# Patient Record
Sex: MALE | Race: WHITE | Employment: OTHER | URBAN - METROPOLITAN AREA
[De-identification: names, ages, dates, MRNs, and addresses within clinical notes are randomized per-mention and may not be internally consistent; named-entity substitution may affect disease eponyms.]

---

## 2017-08-23 ENCOUNTER — ANESTHESIA (OUTPATIENT)
Dept: ENDOSCOPY | Age: 67
End: 2017-08-23
Payer: MEDICARE

## 2017-08-23 ENCOUNTER — HOSPITAL ENCOUNTER (OUTPATIENT)
Age: 67
Setting detail: OUTPATIENT SURGERY
Discharge: HOME | End: 2017-08-23
Attending: INTERNAL MEDICINE | Admitting: INTERNAL MEDICINE
Payer: MEDICARE

## 2017-08-23 ENCOUNTER — ANESTHESIA EVENT (OUTPATIENT)
Dept: ENDOSCOPY | Age: 67
End: 2017-08-23
Payer: MEDICARE

## 2017-08-23 VITALS
WEIGHT: 211 LBS | DIASTOLIC BLOOD PRESSURE: 73 MMHG | HEIGHT: 70 IN | TEMPERATURE: 98.6 F | BODY MASS INDEX: 30.21 KG/M2 | SYSTOLIC BLOOD PRESSURE: 141 MMHG | HEART RATE: 69 BPM | OXYGEN SATURATION: 100 % | RESPIRATION RATE: 18 BRPM

## 2017-08-23 PROCEDURE — 74011000250 HC RX REV CODE- 250: Performed by: ANESTHESIOLOGY

## 2017-08-23 PROCEDURE — 77030008969: Performed by: INTERNAL MEDICINE

## 2017-08-23 PROCEDURE — 76060000031 HC ANESTHESIA FIRST 0.5 HR: Performed by: INTERNAL MEDICINE

## 2017-08-23 PROCEDURE — 74011250636 HC RX REV CODE- 250/636: Performed by: ANESTHESIOLOGY

## 2017-08-23 PROCEDURE — 76040000025: Performed by: INTERNAL MEDICINE

## 2017-08-23 PROCEDURE — 74011000250 HC RX REV CODE- 250

## 2017-08-23 PROCEDURE — 74011250636 HC RX REV CODE- 250/636

## 2017-08-23 RX ORDER — PROPOFOL 10 MG/ML
INJECTION, EMULSION INTRAVENOUS AS NEEDED
Status: DISCONTINUED | OUTPATIENT
Start: 2017-08-23 | End: 2017-08-23 | Stop reason: HOSPADM

## 2017-08-23 RX ORDER — PROPOFOL 10 MG/ML
INJECTION, EMULSION INTRAVENOUS
Status: DISCONTINUED | OUTPATIENT
Start: 2017-08-23 | End: 2017-08-23 | Stop reason: HOSPADM

## 2017-08-23 RX ORDER — LIDOCAINE HYDROCHLORIDE 20 MG/ML
INJECTION, SOLUTION EPIDURAL; INFILTRATION; INTRACAUDAL; PERINEURAL AS NEEDED
Status: DISCONTINUED | OUTPATIENT
Start: 2017-08-23 | End: 2017-08-23 | Stop reason: HOSPADM

## 2017-08-23 RX ORDER — SODIUM CHLORIDE 0.9 % (FLUSH) 0.9 %
5-10 SYRINGE (ML) INJECTION AS NEEDED
Status: CANCELLED | OUTPATIENT
Start: 2017-08-23

## 2017-08-23 RX ORDER — SODIUM CHLORIDE, SODIUM LACTATE, POTASSIUM CHLORIDE, CALCIUM CHLORIDE 600; 310; 30; 20 MG/100ML; MG/100ML; MG/100ML; MG/100ML
100 INJECTION, SOLUTION INTRAVENOUS CONTINUOUS
Status: CANCELLED | OUTPATIENT
Start: 2017-08-23

## 2017-08-23 RX ORDER — SODIUM CHLORIDE, SODIUM LACTATE, POTASSIUM CHLORIDE, CALCIUM CHLORIDE 600; 310; 30; 20 MG/100ML; MG/100ML; MG/100ML; MG/100ML
100 INJECTION, SOLUTION INTRAVENOUS CONTINUOUS
Status: DISCONTINUED | OUTPATIENT
Start: 2017-08-23 | End: 2017-08-23 | Stop reason: HOSPADM

## 2017-08-23 RX ADMIN — LIDOCAINE HYDROCHLORIDE 100 MG: 20 INJECTION, SOLUTION EPIDURAL; INFILTRATION; INTRACAUDAL; PERINEURAL at 15:21

## 2017-08-23 RX ADMIN — FAMOTIDINE 20 MG: 10 INJECTION, SOLUTION INTRAVENOUS at 14:29

## 2017-08-23 RX ADMIN — SODIUM CHLORIDE, SODIUM LACTATE, POTASSIUM CHLORIDE, AND CALCIUM CHLORIDE 100 ML/HR: 600; 310; 30; 20 INJECTION, SOLUTION INTRAVENOUS at 14:25

## 2017-08-23 RX ADMIN — PROPOFOL 100 MG: 10 INJECTION, EMULSION INTRAVENOUS at 15:21

## 2017-08-23 RX ADMIN — PROPOFOL 200 MCG/KG/MIN: 10 INJECTION, EMULSION INTRAVENOUS at 15:21

## 2017-08-23 NOTE — PROGRESS NOTES
Patient passing flatus, tolerating po fluids well. Patient escorted to car via wheelchair  by Chelsy  for discharge home with son. Discharge instructions reviewed. Dr. Juancho Thompson spoke with pt and family.

## 2017-08-23 NOTE — H&P
History and Physical for Endoscopic Ultrasound             Date: 8/23/2017     History of Present Illness:  Patient presents to undergo endoscopic ultrasound for evaluation of a suspected subepithelial lesion in the stomach. Patient denies any diarrhea, constipation, tenesmus, GI bleeding, or weight loss. Past Medical History:   Diagnosis Date    GERD (gastroesophageal reflux disease)     Gout     Hypertension      Past Surgical History:   Procedure Laterality Date    HX CATARACT REMOVAL      HX COLONOSCOPY        Family History   Problem Relation Age of Onset    No Known Problems Mother     No Known Problems Father      Social History   Substance Use Topics    Smoking status: Former Smoker     Quit date: 8/22/1978    Smokeless tobacco: Never Used    Alcohol use No        No Known Allergies  No current facility-administered medications for this encounter. Review of Systems:  A detailed 10 organ review of systems is obtained with pertinent positives as listed in the History of Present Illness. All others are negative. Objective:     Physical Exam:  There were no vitals taken for this visit. General:  Alert and oriented. Heart: Regular rate and rhythm  Lungs:  Clear to auscultation bilaterally  Abdomen: Soft, nontender, nondistended    Impression/Plan:     Proceed with EUS as planned. I have discussed with the patient the technique, benefits, alternatives, and risks of the procedure, including medication reaction, immediate or delayed bleeding, or perforation of the gastrointestinal tract.     Signed By: Saul Sterling MD     August 23, 2017

## 2017-08-23 NOTE — DISCHARGE INSTRUCTIONS
Gastrointestinal Esophagogastroduodenoscopy (EGD) - Upper Exam Discharge Instructions    1. Call Dr. Daniele Cuellar at [phone] for any problems or questions. 2. Contact the doctor's office for follow up appointment as directed. 3. Medication may cause drowsiness for several hours, therefore, do not drive or                  operate machinery for remainder of the day. 4. No alcohol today. 5. Ordinarily, you may resume regular diet and activity after exam unless otherwise              specified by your physician. 6. For mild soreness in your throat you may use Cepacol throat lozenges or warm               salt-water gargles as needed. Any additional instructions:  Plan to repeat EUS in one year. Office will arrange follow up. Instructions given to Laureano Tavera and son.   Instructions given by:  Arben Patel RN

## 2017-08-23 NOTE — IP AVS SNAPSHOT
[facility] 
 
 
 [address] [facility] [[email or web address]] [address] [facility] 
[phone] Patient: Dk Cohen MRN: [id number] ANASTASIA:69/72/2137 You are allergic to the following No active allergies Recent Documentation Height Weight BMI Smoking Status 1.778 m 95.7 kg 30.28 kg/m2 Former Smoker Emergency Contacts Name Discharge Info Relation Home Work Mobile Sami Ambriz  Son [22] [phone] About your hospitalization You were admitted on:  August 23, 2017 You last received care in the:  [facility] You were discharged on:  August 23, 2017 Unit phone number:  [phone] Why you were hospitalized Your primary diagnosis was:  Not on File Providers Seen During Your Hospitalizations Provider Role Specialty Primary office phone Yolanda Munguia MD Attending Provider Gastroenterology [phone] Your Primary Care Physician (PCP) Primary Care Physician Office Phone Office Fax UNKNOWN, PROVIDER ** None ** ** None ** Follow-up Information None Current Discharge Medication List  
  
ASK your doctor about these medications Dose & Instructions Dispensing Information Comments Morning Noon Evening Bedtime  
 allopurinol 100 mg tablet Commonly known as:  Fatou Salts Your last dose was: Your next dose is:    
   
   
 Dose:  100 mg Take 100 mg by mouth daily. Refills:  0  
     
   
   
   
  
 amLODIPine 10 mg tablet Commonly known as:  Remonia Grates Your last dose was: Your next dose is:    
   
   
 Dose:  10 mg Take 10 mg by mouth daily. Refills:  0  
     
   
   
   
  
 BENADRYL 25 mg capsule Generic drug:  diphenhydrAMINE Your last dose was: Your next dose is:    
   
   
 Dose:  50 mg Take 50 mg by mouth nightly as needed. Refills:  0  
     
   
   
   
  
 FLOMAX 0.4 mg capsule Generic drug:  tamsulosin Your last dose was: Your next dose is:    
   
   
 Dose:  0.4 mg Take 0.4 mg by mouth daily. Refills:  0  
     
   
   
   
  
 melatonin 3 mg tablet Your last dose was: Your next dose is:    
   
   
 Dose:  3 mg Take 3 mg by mouth nightly. Refills:  0 PROTONIX 20 mg tablet Generic drug:  pantoprazole Your last dose was: Your next dose is:    
   
   
 Dose:  20 mg Take 20 mg by mouth two (2) times a day. Refills:  0 Discharge Instructions Gastrointestinal Esophagogastroduodenoscopy (EGD) - Upper Exam Discharge Instructions 1. Call Dr. Gallito Morris at [phone] for any problems or questions. 2. Contact the doctor's office for follow up appointment as directed. 3. Medication may cause drowsiness for several hours, therefore, do not drive or                  operate machinery for remainder of the day. 4. No alcohol today. 5. Ordinarily, you may resume regular diet and activity after exam unless otherwise              specified by your physician. 6. For mild soreness in your throat you may use Cepacol throat lozenges or warm               salt-water gargles as needed. Any additional instructions:  Plan to repeat EUS in one year. Office will arrange follow up. Instructions given to Matilda Najjar and son. Instructions given by:  Rip Lundborg, RN Discharge Orders None Introducing [facility]! [address] [facility] introduces [employer] patient portal. Now you can access parts of your medical record, email your doctor's office, and request medication refills online. 1. In your internet browser, go to [email or web address] 2. Click on the First Time User? Click Here link in the Sign In box. You will see the New Member Sign Up page. 3. Enter your [employer] Access Code exactly as it appears below.  You will not need to use this code after youve completed the sign-up process. If you do not sign up before the expiration date, you must request a new code. · [facility] Access Code: [email or web address] Expires: 11/13/2017  3:28 PM 
 
4. Enter the last four digits of your Social Security Number (xxxx) and Date of Birth (mm/dd/yyyy) as indicated and click Submit. You will be taken to the next sign-up page. 5. Create a [facility] ID. This will be your [facility] [employer] login ID and cannot be changed, so think of one that is secure and easy to remember. 6. Create a [employer] password. You can change your password at any time. 7. Enter your Password Reset Question and Answer. This can be used at a later time if you forget your password. 8. Enter your e-mail address. You will receive e-mail notification when new information is available in [address] 9. Click Sign Up. You can now view and download portions of your medical record. 10. Click the Download Summary menu link to download a portable copy of your medical information. If you have questions, please visit the Frequently Asked Questions section of the [facility] [employer] website. Remember, [facility] is NOT to be used for urgent needs. For medical emergencies, dial 911. Now available from your iPhone and Android! General Information Please provide this summary of care documentation to your next provider. Patient Signature:  ____________________________________________________________ Date:  ____________________________________________________________  
  
Claudene Born Provider Signature:  ____________________________________________________________ Date:  ____________________________________________________________

## 2017-08-23 NOTE — OP NOTES
Gastroenterology Procedure Note              Procedure:  Endoscopic ultrasound with Doppler     Date of Procedure:  8/23/2017    Patient:  Matilda Najjar     1950    Indication:  Gastric mass     Sedation:  MAC    Pre-Procedure Physical Exam:    Mental status:  alert and oriented  Airway:  normal oropharyngeal airway and neck mobility  CV:  regular rate and rhythm  Respiratory:  clear to auscultation    Procedure:  A History and Physical has been performed, and patient medication allergies have been  reviewed. Risks of perforation, hemorrhage, adverse drug reaction, and aspiration were discussed. Informed consent was obtained for the procedure, including sedation. The patient was placed in the left lateral decubitus position. The heart rate, oxygen saturations, blood pressure, and response to care were monitored throughout the procedure. The radial echoendoscope was passed through the mouth and advanced under direct vision to the distal duodenum. As the scope was slowly withdrawn, detailed endoscopic images were obtained from the surrounding organs. The patient tolerated the procedure well. Findings:     ENDOSCOPIC FINDINGS:  Limited views of the mucosa with the oblique-viewing echoendoscope reveals a 1 cm subepithelial lesion in the proximal stomach. There are no other gross abnormalities of the stomach or proximal duodenum. STOMACH:  The balloon was insufflated to improve acoustic coupling. Multiple sweeps were made throughout the stomach visualizing the entire wall from the pylorus the gastroesophageal junction. At a distance 44 cm from the incisors, a 7 x 6 mm hypoechoic lesion is seen arising from the submucosal layer of the gastric wall. This is too small for FNA. The remainder of the gastric wall is of normal thickness and normal wall architecture. OTHER ORGANS:  Views of the left lobe of the liver demonstrate diffuse heterogeneity without nodular contour.  The left adrenal gland appears normal. Due to the focused nature of the examination, detailed images of the pancreas and biliary tree were nor obtained. There are no pathologically enlarged upper abdominal lymph nodes. Specimen:  No    Estimated Blood Loss:  None    Implant:  None           Impression:    1. Gastric submucosal lesion. 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 No vascular flow to suggest varix. The differential diagnosis includes gastrointestinal stromal tumor, carcinoid tumor, granular cell tumor tumor. 2. Heterogeneity of the liver. There may represent early or micronodular cirrhosis. Plan:  1. Resume diet and current medications. 2. Would repeat EUS in 1 year to ensure no interval enlargement. 3. Return to office in 1-2 months for ongoing care.     Signed:  Mi Hanna MD  8/23/2017  3:36 PM

## 2017-08-23 NOTE — ANESTHESIA PREPROCEDURE EVALUATION
Anesthetic History   No history of anesthetic complications            Review of Systems / Medical History  Patient summary reviewed and pertinent labs reviewed    Pulmonary  Within defined limits                 Neuro/Psych   Within defined limits           Cardiovascular    Hypertension: well controlled          Hyperlipidemia    Exercise tolerance: >4 METS     GI/Hepatic/Renal     GERD: well controlled           Endo/Other  Within defined limits           Other Findings              Physical Exam    Airway  Mallampati: II  TM Distance: > 6 cm  Neck ROM: normal range of motion   Mouth opening: Normal     Cardiovascular    Rhythm: regular  Rate: normal         Dental    Dentition: Full upper dentures and Lower partial plate     Pulmonary  Breath sounds clear to auscultation               Abdominal         Other Findings            Anesthetic Plan    ASA: 2  Anesthesia type: total IV anesthesia            Anesthetic plan and risks discussed with: Patient

## 2017-08-23 NOTE — IP AVS SNAPSHOT
Summary of Care Report The Summary of Care report has been created to help improve care coordination. Users with access to [email or web address] or [address] (Web-based application) may access additional patient information including the Discharge Summary. If you are not currently a [address] user and need more information, please call the number listed below in the [place] [address] section and ask to be connected with Medical Records. Facility Information Name Address Phone [address] [facility] [address] [phone] Patient Information Patient Name Sex STANFORD Azevedo ([id number]) Male 1950 Discharge Information Admitting Provider Service Area Unit Annemarie Merino MD / [facility] / [phone] Discharge Provider Discharge Date/Time Discharge Disposition Destination (none) (none) (none) (none) Patient Language Language ENGLISH [13] You are allergic to the following No active allergies Current Discharge Medication List  
  
ASK your doctor about these medications Dose & Instructions Dispensing Information Comments  
 allopurinol 100 mg tablet Commonly known as:  Raffaele Jerome Dose:  100 mg Take 100 mg by mouth daily. Refills:  0  
   
 amLODIPine 10 mg tablet Commonly known as:  Valentine Ruiz Dose:  10 mg Take 10 mg by mouth daily. Refills:  0  
   
 BENADRYL 25 mg capsule Generic drug:  diphenhydrAMINE Dose:  50 mg Take 50 mg by mouth nightly as needed. Refills:  0  
   
 FLOMAX 0.4 mg capsule Generic drug:  tamsulosin Dose:  0.4 mg Take 0.4 mg by mouth daily. Refills:  0  
   
 melatonin 3 mg tablet Dose:  3 mg Take 3 mg by mouth nightly. Refills:  0 PROTONIX 20 mg tablet Generic drug:  pantoprazole  Dose:  20 mg  
 Take 20 mg by mouth two (2) times a day. Refills:  0 Surgery Information ID Date/Time Status Primary Surgeon All Procedures Location [id number] 8/23/2017 1400 Unposted Van Antoine MD ENDOSCOPIC ULTRASOUND (EUS)  BMI UNKNOWN [facility] Follow-up Information None Discharge Instructions Gastrointestinal Esophagogastroduodenoscopy (EGD) - Upper Exam Discharge Instructions 1. Call Dr. Orquidea Mchugh at [phone] for any problems or questions. 2. Contact the doctor's office for follow up appointment as directed. 3. Medication may cause drowsiness for several hours, therefore, do not drive or                  operate machinery for remainder of the day. 4. No alcohol today. 5. Ordinarily, you may resume regular diet and activity after exam unless otherwise              specified by your physician. 6. For mild soreness in your throat you may use Cepacol throat lozenges or warm               salt-water gargles as needed. Any additional instructions:  Plan to repeat EUS in one year. Office will arrange follow up. Instructions given to Jeraldjohanna Corona and son. Instructions given by:  Frank Vaughan RN Chart Review Routing History No Routing History on File

## (undated) DEVICE — MOUTHPIECE ENDOSCP 20X27MM --

## (undated) DEVICE — BLLN KT O RING ENDOSCP US --